# Patient Record
Sex: MALE | Race: WHITE | Employment: STUDENT | URBAN - METROPOLITAN AREA
[De-identification: names, ages, dates, MRNs, and addresses within clinical notes are randomized per-mention and may not be internally consistent; named-entity substitution may affect disease eponyms.]

---
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
## 2024-02-27 NOTE — LETTER
[facility] [employer] of Child Health Examination       Student's Name  Mackenzie Bettencourt Title         MD                  Date 02/25/21      Signature Level/ID#      HEALTH HISTORY          TO BE COMPLETED AND SIGNED BY PARENT/GUARDIAN AND VERIFIED BY HEALTH CARE PROVIDER    ALLERGIES  (Food, drug, insect, other)  Peanuts MEDICATION  (List all prescribed or taken on a regular basis.)     Diagnosis of ast BMI 20.75 kg/m²     DIABETES SCREENING  BMI>85% age/sex  No And any two of the following:  Family History No    Ethnic Minority  No          Signs of Insulin Resistance (hypertension, dyslipidemia, polycystic ovarian syndrome, acanthosis nigricans)    No medication (e.g. Short Acting Beta Antagonist): No          Controller medication (e.g. inhaled corticosteroid):   No Other   NEEDS/MODIFICATIONS required in the school setting  None DIETARY Needs/Restrictions     Peanut Allergy   SPECIAL INSTRUCTIONS/MULU 15

## 2024-10-21 NOTE — PATIENT INSTRUCTIONS
Pediatric Allergy  Anatoliy Clark [phone]  Well-Child Checkup: 15 to 18 Years  During the teen years, it’s important to keep having yearly checkups. Your teen may be embarrassed about having a checkup.  Reassure your teen that the exam is normal and n · Body changes. The body grows and matures during puberty. Hair will grow in the pubic area and on other parts of the body. Girls grow breasts and menstruate (have monthly periods). A boy’s voice changes, becoming lower and deeper.  As the penis matures, er · Eat healthy. Your child should eat fruits, vegetables, lean meats, and whole grains every day. Less healthy foods—like french fries, candy, and chips—should be eaten rarely.  Some teens fall into the trap of snacking on junk food and fast food throughout · Encourage your teen to keep a consistent bedtime, even on weekends. Sleeping is easier when the body follows a routine. Don’t let your teen stay up too late at night or sleep in too long in the morning. · Help your teen wake up, if needed.  Go into the b · Set rules and limits around driving and use of the car. If your teen gets a ticket or has an accident, there should be consequences. Driving is a privilege that can be taken away if your child doesn’t follow the rules.   · Teach your child to make good de © 6263-9397 [employer] [address]. All rights reserved. This information is not intended as a substitute for professional medical care. Always follow your healthcare professional's instructions. You can access the FollowMyHealth Patient Portal offered by [facility] by registering at the following website: [email or web address]. By joining [employer]’s FollowMyHealth portal, you will also be able to view your health information using other applications (apps) compatible with our system.

## 2025-01-15 ENCOUNTER — APPOINTMENT (OUTPATIENT)
Dept: URBAN - METROPOLITAN AREA CLINIC 244 | Age: 20
Setting detail: DERMATOLOGY
End: 2025-01-15

## 2025-01-15 DIAGNOSIS — L20.89 OTHER ATOPIC DERMATITIS: ICD-10-CM

## 2025-01-15 PROCEDURE — OTHER PRESCRIPTION: OTHER

## 2025-01-15 PROCEDURE — 99204 OFFICE O/P NEW MOD 45 MIN: CPT

## 2025-01-15 PROCEDURE — OTHER ADDITIONAL NOTES: OTHER

## 2025-01-15 PROCEDURE — OTHER COUNSELING: OTHER

## 2025-01-15 PROCEDURE — OTHER PRESCRIPTION MEDICATION MANAGEMENT: OTHER

## 2025-01-15 RX ORDER — TRIAMCINOLONE ACETONIDE 1 MG/G
CREAM TOPICAL BID
Qty: 80 | Refills: 0 | Status: ERX | COMMUNITY
Start: 2025-01-15

## 2025-01-15 RX ORDER — TACROLIMUS 1 MG/G
OINTMENT TOPICAL BID
Qty: 60 | Refills: 1 | Status: ERX | COMMUNITY
Start: 2025-01-15

## 2025-01-15 ASSESSMENT — LOCATION DETAILED DESCRIPTION DERM
LOCATION DETAILED: RIGHT CENTRAL EYEBROW
LOCATION DETAILED: LEFT ANTECUBITAL SKIN
LOCATION DETAILED: RIGHT ANTECUBITAL SKIN
LOCATION DETAILED: RIGHT CENTRAL LATERAL NECK

## 2025-01-15 ASSESSMENT — LOCATION SIMPLE DESCRIPTION DERM
LOCATION SIMPLE: NECK
LOCATION SIMPLE: RIGHT EYEBROW
LOCATION SIMPLE: RIGHT ELBOW
LOCATION SIMPLE: LEFT ELBOW

## 2025-01-15 ASSESSMENT — LOCATION ZONE DERM
LOCATION ZONE: NECK
LOCATION ZONE: ARM
LOCATION ZONE: FACE

## 2025-01-15 NOTE — PROCEDURE: ADDITIONAL NOTES
Additional Notes: Pt will come back in 4 weeks to be assessed on how he is doing. If eczema not controlled,  will consider going on either pill or injection treatment.
Render Risk Assessment In Note?: no
Detail Level: Simple

## 2025-01-15 NOTE — PROCEDURE: PRESCRIPTION MEDICATION MANAGEMENT
Render In Strict Bullet Format?: No
Detail Level: Zone
Initiate Treatment: Will start Triamcinolone 0.1% twice a day for 2 weeks on neck then switch to tacrolimus \\nand Tacrolimus 0.1% twice a day for 4 weeks on other locations

## 2025-01-30 ENCOUNTER — RX ONLY (RX ONLY)
Age: 20
End: 2025-01-30

## 2025-01-30 RX ORDER — TACROLIMUS 1 MG/G
OINTMENT TOPICAL BID
Qty: 60 | Refills: 1 | Status: ERX

## 2025-02-03 ENCOUNTER — RX ONLY (RX ONLY)
Age: 20
End: 2025-02-03

## 2025-02-03 RX ORDER — TACROLIMUS 1 MG/G
OINTMENT TOPICAL BID
Qty: 60 | Refills: 1 | Status: ERX

## 2025-02-12 ENCOUNTER — APPOINTMENT (OUTPATIENT)
Dept: URBAN - METROPOLITAN AREA CLINIC 244 | Age: 20
Setting detail: DERMATOLOGY
End: 2025-02-12

## 2025-02-12 DIAGNOSIS — L20.89 OTHER ATOPIC DERMATITIS: ICD-10-CM

## 2025-02-12 PROCEDURE — 99213 OFFICE O/P EST LOW 20 MIN: CPT

## 2025-02-12 PROCEDURE — OTHER ADDITIONAL NOTES: OTHER

## 2025-02-12 PROCEDURE — OTHER PRESCRIPTION MEDICATION MANAGEMENT: OTHER

## 2025-02-12 PROCEDURE — OTHER COUNSELING: OTHER

## 2025-02-12 NOTE — PROCEDURE: ADDITIONAL NOTES
Additional Notes: Pt doing fine with ointment medications if eczema flares up and topicals not working anymore will consider going on either pill or injection treatment.
Render Risk Assessment In Note?: no
Detail Level: Simple

## 2025-02-12 NOTE — PROCEDURE: PRESCRIPTION MEDICATION MANAGEMENT
Render In Strict Bullet Format?: No
Modify Regimen: triamcinolone acetonide 0.1 % topical cream BID\\nQuantity: 80.0 g  Days Supply: 30\\nSig: Apply twice daily to affected areas on body twice weekly for maintenance \\ntacrolimus 0.1 % topical ointment Bid\\nQuantity: 60.0 g  Days Supply: 30\\nSig: NONSTEROID Apply bid to affected areas of eczema  twice a day x 4 weeks, then as needed for itch.
Detail Level: Zone

## 2025-02-24 ENCOUNTER — NURSE ONLY (OUTPATIENT)
Dept: ALLERGY | Facility: CLINIC | Age: 20
End: 2025-02-24

## 2025-02-24 ENCOUNTER — OFFICE VISIT (OUTPATIENT)
Dept: ALLERGY | Facility: CLINIC | Age: 20
End: 2025-02-24
Payer: COMMERCIAL

## 2025-02-24 VITALS — HEIGHT: 72 IN | WEIGHT: 146 LBS | BODY MASS INDEX: 19.77 KG/M2

## 2025-02-24 DIAGNOSIS — Z23 NEED FOR COVID-19 VACCINE: ICD-10-CM

## 2025-02-24 DIAGNOSIS — Z23 FLU VACCINE NEED: ICD-10-CM

## 2025-02-24 DIAGNOSIS — L20.89 OTHER ATOPIC DERMATITIS: ICD-10-CM

## 2025-02-24 DIAGNOSIS — Z91.018 FOOD ALLERGY: Primary | ICD-10-CM

## 2025-02-24 PROCEDURE — 99204 OFFICE O/P NEW MOD 45 MIN: CPT | Performed by: ALLERGY & IMMUNOLOGY

## 2025-02-24 PROCEDURE — 3008F BODY MASS INDEX DOCD: CPT | Performed by: ALLERGY & IMMUNOLOGY

## 2025-02-24 PROCEDURE — 95004 PERQ TESTS W/ALRGNC XTRCS: CPT | Performed by: ALLERGY & IMMUNOLOGY

## 2025-02-24 RX ORDER — EPINEPHRINE 0.3 MG/.3ML
INJECTION SUBCUTANEOUS
Qty: 1 EACH | Refills: 0 | Status: SHIPPED | OUTPATIENT
Start: 2025-02-24

## 2025-02-24 NOTE — PATIENT INSTRUCTIONS
Assessment & Plan  Food Allergies  Peanut and cashew allergies diagnosed at 1.5 years old with initial reaction of hives and rash. No ED visits or EpiPen usage since. Current EpiPen . Avoiding all nuts. Eligible for skin testing today (no antihistamines in past five days). Discussed skin test to detect IgE antibodies and potential oral challenge if negative. If positive, continued avoidance and possible serum IgE testing. Reviewed 20% chance of outgrowing nut allergy.  - Perform skin testing for peanuts and tree nuts  - If skin test is positive, recommend continued avoidance and consider serum IgE testing  - If skin test is negative, recommend oral challenge if interested in consuming nuts  - Renew EpiPen prescription  Reviewed approximately 20% chance of outgrowing a nut allergy    Atopic Dermatitis  Mild atopic dermatitis, well-managed with moisturizers. No recent topical steroid use. Reviewed skin care regimen and potential use of topical steroids or newer steroid-free topicals if condition worsens.  - Continue current moisturizer regimen (Cetaphil, CeraVe, Vanicream, Aquaphor)  - Use topical steroids as needed for redness, inflammation, or itching    General Health Maintenance  Discussed flu vaccine and COVID-19 booster. Has not received flu shot this fall and deferred it today. Informed about availability of recent COVID-19 booster since 2024.  - Recommend flu vaccine  - Recommend COVID-19 booster, available at local pharmacies    Follow-up  - Follow-up after skin test results to discuss further steps.            Orders This Visit:  No orders of the defined types were placed in this encounter.      Meds This Visit:  Requested Prescriptions     Signed Prescriptions Disp Refills    EPINEPHrine (EPIPEN 2-NATANAEL) 0.3 MG/0.3ML Injection Solution Auto-injector 1 each 0     Sig: Inject IM in event of  allergic reaction

## 2025-02-24 NOTE — PROGRESS NOTES
Reese Rosado is a 19 year old male.    HPI:     Chief Complaint   Patient presents with    Food Allergy     Consult.  Patient presents with concern of history of allergy to peanuts and cashew as a child.  He would like to be retested for Tree Nut and Peanut Allergies.  He denies having an anaphylactic reaction to the above foods.      Patient is a 19-year-old male who presents for allergy consultation upon referral of his PCP, Dr. Marx with a chief complaint of allergies including foods  Review of records notes history of peanut allergy  No medications listed  Immunizations reviewed.  No COVID vaccines on record.  Last flu vaccine on record from 2022.    Today patient reports        History of Present Illness  Reese Rosado is a 19 year old male with food allergies who presents for re-evaluation of nut allergies.    He has a history of food allergies, specifically to nuts, first identified at approximately 18 months of age following a reaction to Chinese food containing nuts, resulting in hives and a rash. He was taken to the emergency room at that time. Since then, he has avoided all nuts, including cashews and peanuts, despite only being tested for these two. He has not had any further emergency room visits or the need to use an EpiPen since the initial reaction.    He currently possesses an EpiPen, although it is likely . He has not taken any antihistamines in the past five days, which allows for skin testing to be conducted today. He is considering retesting for nut allergies as it has been 10 to 12 years since his last evaluation.    In addition to his food allergies, he has mild eczema, which is currently stable with the use of moisturizers. He has previously used topical steroids but is not using any at present. No other allergic tendencies such as asthma or seasonal allergies are reported.    He is a student at [employer], studying [employer].          HISTORY:  Past Medical History:    Hyperopia    OU      Past Surgical History:   Procedure Laterality Date    [place] teeth removed Bilateral     x4      Family History   Problem Relation Age of Onset    Eye Problems Other         \"lazy eye\" - grandfather    Glaucoma Neg     Diabetes Neg     Heart Disorder Neg     Hypertension Neg       Social History:   Social History     Socioeconomic History    Marital status: Single   Tobacco Use    Smoking status: Never     Passive exposure: Never    Smokeless tobacco: Never   Vaping Use    Vaping status: Never Used   Substance and Sexual Activity    Alcohol use: Never    Drug use: Never   Other Topics Concern    Second-hand smoke exposure No    Alcohol/drug concerns No    Violence concerns No        Medications (Active prior to today's visit):  Current Outpatient Medications   Medication Sig Dispense Refill    EPINEPHrine (EPIPEN 2-NATANAEL) 0.3 MG/0.3ML Injection Solution Auto-injector Inject IM in event of  allergic reaction 1 each 0       Allergies:  Allergies[1]      ROS:     Allergic/Immuno:  See HPI  Cardiovascular:  Negative for irregular heartbeat/palpitations, chest pain, edema  Constitutional:  Negative night sweats,weight loss, irritability and lethargy  Endocrine:  Negative for cold intolerance, polydipsia and polyphagia  ENMT:  Negative for ear drainage, hearing loss and nasal drainage  Eyes:  Negative for eye discharge and vision loss  Gastrointestinal:  Negative for abdominal pain, diarrhea and vomiting  Genitourinary:  Negative for dysuria and hematuria  Hema/Lymph:  Negative for easy bleeding and easy bruising  Integumentary:  seehpi  Musculoskeletal:  Negative for joint symptoms  Neurological:  Negative for dizziness, seizures  Psychiatric:  Negative for inappropriate interaction and psychiatric symptoms  Respiratory:  Negative for cough, dyspnea and wheezing      PHYSICAL EXAM:   Constitutional: responsive, no acute distress noted  Head/Face:  NC/Atraumatic  Eyes/Vision: conjunctiva and lids are normal extraocular motion is intact   Ears/Audiometry: tympanic membranes are normal bilaterally hearing is grossly intact  Nose/Mouth/Throat: nose and throat are clear mucous membranes are moist   Neck/Thyroid: neck is supple without adenopathy  Lymphatic: no abnormal cervical, supraclavicular or axillary adenopathy is noted  Respiratory: normal to inspection lungs are clear to auscultation bilaterally normal respiratory effort   Cardiovascular: regular rate and rhythm no murmurs, gallups, or rubs  Abdomen: soft non-tender non-distended  Skin/Hair: no unusual rashes present  Extremities: no edema, cyanosis, or clubbing  Neurological:Oriented to time, place, person & situation       ASSESSMENT/PLAN:   Assessment   Encounter Diagnoses   Name Primary?    Food allergy Yes    Flu vaccine need     Need for COVID-19 vaccine     Other atopic dermatitis      Skin testing today to peanut tree nut panel was positive to cashew pistachio walnut and negative to the remaining foods      Positive histamine control assessment and plan   Assessment & Plan  Food Allergies  Peanut and cashew allergies diagnosed at 1.5 years old with initial reaction of hives and rash. No ED visits or EpiPen usage since. Current EpiPen . Avoiding all nuts. Eligible for skin testing today (no antihistamines in past five days). Discussed skin test to detect IgE antibodies and potential oral challenge if negative. If positive, continued avoidance and possible serum IgE testing. Reviewed 20% chance of outgrowing nut allergy.  - Perform skin testing for peanuts and tree nuts  - If skin test is positive, recommend continued avoidance and consider serum IgE testing  - If skin test is negative, recommend oral challenge if interested in consuming nuts  - Renew EpiPen prescription  Reviewed approximately 20% chance of outgrowing a nut allergy    Atopic Dermatitis  Mild atopic dermatitis, well-managed  with moisturizers. No recent topical steroid use. Reviewed skin care regimen and potential use of topical steroids or newer steroid-free topicals if condition worsens.  - Continue current moisturizer regimen (Cetaphil, CeraVe, Vanicream, Aquaphor)  - Use topical steroids as needed for redness, inflammation, or itching    General Health Maintenance  Discussed flu vaccine and COVID-19 booster. Has not received flu shot this fall and deferred it today. Informed about availability of recent COVID-19 booster since September 2024.  - Recommend flu vaccine  - Recommend COVID-19 booster, available at local pharmacies    Follow-up  - Follow-up after skin test results to discuss further steps.            Orders This Visit:  No orders of the defined types were placed in this encounter.      Meds This Visit:  Requested Prescriptions     Signed Prescriptions Disp Refills    EPINEPHrine (EPIPEN 2-NATANAEL) 0.3 MG/0.3ML Injection Solution Auto-injector 1 each 0     Sig: Inject IM in event of  allergic reaction       Imaging & Referrals:  None     2/24/2025  Iain Moise MD      If medication samples were provided today, they were provided solely for patient education and training related to self administration of these medications.  Teaching, instruction and sample was provided to the patient by myself.  Teaching included  a review of potential adverse side effects as well as potential efficacy.  Patient's questions were answered in regards to medication administration and dosing and potential side effects. Teaching was provided via the teach back method         [1]   Allergies  Allergen Reactions    Peanuts HIVES

## 2025-02-24 NOTE — PROGRESS NOTES
The following individual(s) verbally consented to be recorded using ambient AI listening technology and understand that they can each withdraw their consent to this listening technology at any point by asking the clinician to turn off or pause the recording:    Patient name: Reese Rosado  Additional names:

## (undated) NOTE — LETTER
[facility] [employer] of Child Health Examination       Student's Name  Omkar Spencer Title            DO               Date  8/15/2019   Signature HEALTH HISTORY          TO BE COMPLETED AND SIGNED BY PARENT/GUARDIAN AND VERIFIED BY HEALTH CARE PROVIDER    ALLERGIES  (Food, drug, insect, other)  Peanuts MEDICATION  (List all prescribed or taken on a regular basis.)  No current outpatient medications Ht 5' 9\" (1.753 m)   Wt 54.9 kg (121 lb)   BMI 17.87 kg/m²     DIABETES SCREENING  BMI>85% age/sex  No And any two of the following:  Family History No    Ethnic Minority  No          Signs of Insulin Resistance (hypertension, dyslipidemia, polycystic ova Currently Prescribed Asthma Medication:            Quick-relief  medication (e.g. Short Acting Beta Antagonist): No          Controller medication (e.g. inhaled corticosteroid):   No Other   NEEDS/MODIFICATIONS required in the school setting  None DIET

## (undated) NOTE — Clinical Note
VACCINE ADMINISTRATION RECORD  PARENT / GUARDIAN APPROVAL  Date: 2017  Vaccine administered to:  Rose Chang     : 11/10/2005    MRN: [id number]    A copy of the appropriate Centers for Disease Control and Prevention Vaccine Information sta

## (undated) NOTE — LETTER
Name:  Jennifer Alex Year:  9th Grade Class: Student ID No.:   Address:  [address] [place] [address] Phone:  [phone] (home)  :  12year old   Name Relationship [address] Work Phone Home Phone Mobile Phone   1.  [facility] Ban syndrome, short QT syndrome, Brugada syndrome, or catecholaminergic polymorphic ventricular tachycardia? 13. Does anyone in your family have a heart problem, pacemaker, or implanted defibrillator?      12. Has anyone in your family had unexplained faint 37. Do you have headaches with exercise? 38. Have you ever had numbness, tingling, or weakness in your arms or legs after being hit or falling? 39.Have you ever been unable to move your arms / legs after being hit /fall? 40.  Have you ever becom Eyes/Ears/Nose/Throat:  Pupils equal    Hearing Yes    Lymph nodes Yes    Heart*  · Murmurs (auscultation standing, supine, +/- Valsalva)  · Location of point of maximal impulse (PMI) Yes    Pulses Yes    Lungs Yes    Abdomen Yes    Genitourinary (males on defined in the [facility] Performance-Enhancing Substance Testing Program Protocol.  We have reviewed the policy and understand that I/our student may be asked to submit to testing for the presence of performance-enhancing substances in my/his/her body either dur

## (undated) NOTE — LETTER
7/30/2018              Byron Rosado        [address]        [facility] [address]       SCHOOL MEDICATION PERMISSION FORM    SCHOOL DISTRICT:      TO BE COMPLETED IN DETAIL BY THE PARENT/GUARDIAN:    STUDENT'S NAME:  Rafael Diane

## (undated) NOTE — Clinical Note
Name:  Alan Lozano Year:  6-7th grade Class: Student ID No.:   Address:  [address] [place] [address] Phone:  [phone] (home)  :  14year old   Name Relationship [address] Work Phone Home Phone Mobile Phone   1.  Nicho Bustillos syndrome, arrhythmogenic right ventricular cardiomyopathy, long QT syndrome, short QT syndrome, Brugada syndrome, or catecholaminergic polymorphic ventricular tachycardia?      13. Does anyone in your family have a heart problem, pacemaker, or implanted def 39. Do you have a history of seizure disorder?     37. Do you have headaches with exercise? 38. Have you ever had numbness, tingling, or weakness in your arms or legs after being hit or falling?      39.Have you ever been unable to move your arms / legs a excavatum,      arachnodactyly, arm span > height, hyperlaxity, myopia, MVP, aortic insufficiency) Yes    Eyes/Ears/Nose/Throat:  Pupils equal    Hearing Yes    Lymph nodes Yes    Heart*  · Murmurs (auscultation standing, supine, +/- Valsalva)  · Location Protocol.  We have reviewed the policy and understand that I/our student may be asked to submit to testing for the presence of performance-enhancing substances in my/his/her body either during [employer] state series events or during the school day, and I/our brad

## (undated) NOTE — LETTER
[facility] [place] [address] [facility] [address]  Dept: [phone]  Dept Fax: [phone]  Loc: [phone]      January 25, 2018    Patient: Charles Cantrell   Date of Visit: 1/25/2018       To Whom It May Concern:

## (undated) NOTE — ED AVS SNAPSHOT
Parent/Legal Guardian Access to the Online [employer] Record of a Patient 15to 16Years Old  Return completed form by Secure email to [facility] HIM/Medical Records Department: [email or web address].     Requirements and Procedures   Under [facility] ·  I understand that [address] is intended as a secure online source of confidential medical information.  If I share my MyChart ID and password with another person, that person may be able to view my or my child’s health information, and health information a · This form does not substitute as an Authorization to Release health information to a designated proxy by any other method. The purpose of this Minor Proxy form is for access to the [employer] portal information.     By signing below, I acknowledge that I ha I have read and understand the requirements and procedures for accessing my child’s medical record information online as provided  on page one of this document titled, Parent/Legal Guardian Access to the Online MyChart Record of a Patient [address] to [address]

## (undated) NOTE — MR AVS SNAPSHOT
[place]  [address]  [phone]               Thank you for choosing us for your health care visit with Melvina Alegria MD.  We are glad to serve you and happy to provide you with this summa and authority? Does your child participate in family events, or does he or she withdraw from other family members? · Risky behaviors. It’s not too early to start talking to your child about drugs, alcohol, smoking, and sex.  Make sure your child understand · Emotional changes. Along with these physical changes, you’ll likely notice changes in your child’s personality. You may notice your child developing an interest in dating and becoming “more than friends” with others.  Also, many kids become giang and deve Let them stop eating when they’re full—don’t make them clean their plates. Be aware that many kids’ appetites increase during puberty. If your child is still hungry after a meal, offer seconds of vegetables or fruit. · Serve and encourage healthy foods.  Surinder Holman Children shorter than 4'9\" (57 inches) should continue to use a booster seat to properly position the seat belt. · If your child has a cell phone or portable music player, make sure these are used safely and responsibly.  Do not allow your child to talk o Remind your child that you can check the web browser history and cell phone logs to know how these devices are being used. Use parental controls and passwords to block access to inappropriate websites.  Use privacy settings on websites so only your child’s - additional instructions   Commonly known as:  EPIPEN 2-NATANAEL                Where to Get Your Medications      These medications were sent to [address] [address] [facility], [address]  [address], [phone] o Create a home where healthy choices are available and encouraged  o Make it fun – find ways to engage your children such as:  o playing a game of tag  o cooking healthy meals together  o creating a rainbow shopping list to find colorful fruits and vegeta

## (undated) NOTE — LETTER
Name:  Jo-Anngustabomoni Figueredo Year:    Class: Student ID No.:   Address:  [address] [place] [address] Phone:  [phone] (home)  :  11year old   Name Relationship [address] Work Phone Home Phone Mobile Phone   1.  Efra Mendez 12. Has anyone in your family had unexplained fainting, seizures, or near drowning? BONE AND JOINT QUESTIONS Yes No   17. Have you ever had an injury to a bone, muscle, ligament, or tendon that caused you to miss a practice or a game?      25. Have yo /fall?     36. Have you ever become ill while exercising in the heat?     41. Do you get frequent muscle cramps when exercising? 42. Do you or someone in your family have sickle cell trait or disease? 43.  Have you ever had any problems with your ey Abdomen Yes    Genitourinary (males only)* Yes    Skin:  HSV, lesions suggestive of MRSA, tinea corporis Yes    Neurologic* Yes    MUSCULOSKELETAL     Neck Yes    Back Yes    Shoulder/arm Yes    Elbow/forearm Yes    Wrist/hand/fingers Yes    Hip/thigh [place] state series events or during the school day, and I/our student do/does hereby agree to submit to such testing and analysis by a certified laboratory.  We further understand and agree that the results of the performance-enhancing substance testing may be pr

## (undated) NOTE — LETTER
Name:  Ayla Vasquez Year:  8th Grade Class: Student ID No.:   Address:  [address] [place] [address] Phone:  [phone] (home)  :  [employer] [facility]   Name Relationship [address] Work Phone Home Phone Mobile Phone   1.  Jeanie Adan syndrome, short QT syndrome, Brugada syndrome, or catecholaminergic polymorphic ventricular tachycardia? No   15. Does anyone in your family have a heart problem, pacemaker, or implanted defibrillator? No   16.  Has anyone in your family had unexplained lakisha 39. Do you have a history of seizure disorder? No   37. Do you have headaches with exercise? No   38. Have you ever had numbness, tingling, or weakness in your arms or legs after being hit or falling? No   39. Have you ever been unable to move your arms / l Appearance:  Marfan stigmata (kyphoscoliosis, high-arched palate, pectus excavatum,      arachnodactyly, arm span > height, hyperlaxity, myopia, MVP, aortic insufficiency) Yes    Eyes/Ears/Nose/Throat:    · Pupils equal  · Hearing Yes    Lymph nodes Yes that I/our student will not use performance-enhancing substances as defined in the [facility] Performance-Enhancing Substance Testing Program Protocol.  We have reviewed the policy and understand that I/our student may be asked to submit to testing for the presen

## (undated) NOTE — LETTER
VACCINE ADMINISTRATION RECORD  PARENT / GUARDIAN APPROVAL  Date: 2021  Vaccine administered to:  Ayleen Mclain     : 11/10/2005    MRN: [id number]    A copy of the appropriate Centers for Disease Control and Prevention Vaccine Information sta